# Patient Record
Sex: FEMALE | Employment: OTHER | URBAN - METROPOLITAN AREA
[De-identification: names, ages, dates, MRNs, and addresses within clinical notes are randomized per-mention and may not be internally consistent; named-entity substitution may affect disease eponyms.]

---

## 2017-10-10 ENCOUNTER — OFFICE VISIT (OUTPATIENT)
Dept: ORTHOPEDIC SURGERY | Age: 82
End: 2017-10-10
Payer: MEDICARE

## 2017-10-10 VITALS — WEIGHT: 120 LBS | HEIGHT: 62 IN | BODY MASS INDEX: 22.08 KG/M2

## 2017-10-10 DIAGNOSIS — S92.354B OPEN NONDISPLACED FRACTURE OF FIFTH METATARSAL BONE OF RIGHT FOOT, INITIAL ENCOUNTER: Primary | ICD-10-CM

## 2017-10-10 PROCEDURE — 28470 CLTX METATARSAL FX WO MNP EA: CPT | Performed by: ORTHOPAEDIC SURGERY

## 2017-10-10 PROCEDURE — 99024 POSTOP FOLLOW-UP VISIT: CPT | Performed by: ORTHOPAEDIC SURGERY

## 2017-10-10 RX ORDER — ALPRAZOLAM 0.5 MG/1
TABLET ORAL
Refills: 2 | COMMUNITY
Start: 2017-08-07

## 2017-10-10 RX ORDER — FENTANYL 50 UG/H
1 PATCH TRANSDERMAL
COMMUNITY
Start: 2017-08-30

## 2017-10-10 RX ORDER — ASPIRIN 81 MG/1
81 TABLET, CHEWABLE ORAL
COMMUNITY

## 2017-10-10 RX ORDER — CITALOPRAM 20 MG/1
TABLET ORAL
COMMUNITY
Start: 2017-06-01

## 2017-10-10 RX ORDER — POTASSIUM BICARBONATE 25 MEQ/1
25 TABLET, EFFERVESCENT ORAL
COMMUNITY
Start: 2016-11-09

## 2017-10-10 RX ORDER — NAPROXEN 375 MG/1
TABLET ORAL
Refills: 0 | COMMUNITY
Start: 2017-10-07

## 2017-10-10 RX ORDER — LEVOTHYROXINE SODIUM 0.1 MG/1
TABLET ORAL
COMMUNITY
Start: 2017-05-05

## 2017-10-10 NOTE — PROGRESS NOTES
This 80year-old patient is seen here for an injury she sustained to her right foot on October 5. She says this happened at home she just lost her balance and twisted the foot. She was seen in the emergency room and had x-rays carried out and given a cam walker boot. Examination: She was able to stand up without the Cam Walker boot putting full weight on it. There is no rotational deformity. There is some swelling and ecchymosis over the lateral border of the right foot. X-rays: I reviewed the x-rays are which show a nondisplaced fracture neck of the right fifth metatarsal.    Diagnosis: Nondisplaced fracture right fifth metatarsal.    Treatment: I have given her a fracture shoe and told her that she does not need to use the cam walker boot and she will have an x-ray next week and then I will see her again on October 26. With new x-rays.

## 2017-10-24 DIAGNOSIS — S92.354D: Primary | ICD-10-CM

## 2017-10-25 ENCOUNTER — HOSPITAL ENCOUNTER (OUTPATIENT)
Age: 82
Discharge: HOME | End: 2017-10-25
Payer: MEDICARE

## 2017-10-25 ENCOUNTER — HOSPITAL ENCOUNTER (OUTPATIENT)
Dept: GENERAL RADIOLOGY | Age: 82
Discharge: HOME | End: 2017-10-25
Payer: MEDICARE

## 2017-10-25 DIAGNOSIS — S92.354D: ICD-10-CM

## 2017-10-25 PROCEDURE — 73630 X-RAY EXAM OF FOOT: CPT

## 2017-10-26 ENCOUNTER — OFFICE VISIT (OUTPATIENT)
Dept: ORTHOPEDIC SURGERY | Age: 82
End: 2017-10-26

## 2017-10-26 VITALS — HEIGHT: 63 IN | WEIGHT: 127 LBS | BODY MASS INDEX: 22.5 KG/M2

## 2017-10-26 DIAGNOSIS — S92.354D CLOSED NONDISPLACED FRACTURE OF FIFTH METATARSAL BONE OF RIGHT FOOT WITH ROUTINE HEALING, SUBSEQUENT ENCOUNTER: Primary | ICD-10-CM

## 2017-10-26 PROCEDURE — 99024 POSTOP FOLLOW-UP VISIT: CPT | Performed by: ORTHOPAEDIC SURGERY

## 2017-10-26 NOTE — PROGRESS NOTES
It patient who is sustained a fracture of the neck shaft junction of the right fifth metatarsal is seen in follow-up. Her injury was in October 5. The patient has been ambulating with the fracture shoe. She has minimal discomfort. Examination: There was no ecchymosis and no swelling and minimal tenderness at the fracture site. X-rays: Further x-rays taken today show there has not been any change in the alignment which remains acceptable and there has not been much callus formation. Diagnosis: Clinically healing fracture. Junction of the little right fifth metatarsal.    Treatment: Since there is no swelling now she can get into her regular shoe and I will see her in 3 weeks' time for clinical assessment.

## 2017-11-16 ENCOUNTER — OFFICE VISIT (OUTPATIENT)
Dept: ORTHOPEDIC SURGERY | Age: 82
End: 2017-11-16

## 2017-11-16 VITALS — WEIGHT: 126.98 LBS | BODY MASS INDEX: 22.5 KG/M2 | HEIGHT: 63 IN

## 2017-11-16 DIAGNOSIS — S92.354D CLOSED NONDISPLACED FRACTURE OF FIFTH METATARSAL BONE OF RIGHT FOOT WITH ROUTINE HEALING, SUBSEQUENT ENCOUNTER: Primary | ICD-10-CM

## 2017-11-16 PROCEDURE — 99024 POSTOP FOLLOW-UP VISIT: CPT | Performed by: ORTHOPAEDIC SURGERY

## 2017-11-16 NOTE — PROGRESS NOTES
This patient who is sustained a fracture at the junction of the neck and shaft of the right fifth metatarsal is doing very well. She only has slight occasional ache. She is ambulating regularly. There was minimal tenderness if any at all. She is advised to return to full activities and will see her when necessary.